# Patient Record
Sex: FEMALE | Race: WHITE | NOT HISPANIC OR LATINO | URBAN - METROPOLITAN AREA
[De-identification: names, ages, dates, MRNs, and addresses within clinical notes are randomized per-mention and may not be internally consistent; named-entity substitution may affect disease eponyms.]

---
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
## 2024-02-16 NOTE — ED PEDIATRIC NURSE NOTE - GASTROINTESTINAL ASSESSMENT
Pt's O2 desaturated into the 80s. Placed on 2 L O2. ERP notified.     ERP at bedside.     Pt medicated per MAR.   - - -